# Patient Record
Sex: MALE | Race: WHITE | NOT HISPANIC OR LATINO | Employment: UNEMPLOYED | RURAL
[De-identification: names, ages, dates, MRNs, and addresses within clinical notes are randomized per-mention and may not be internally consistent; named-entity substitution may affect disease eponyms.]

---

## 2017-04-17 ENCOUNTER — OFFICE VISIT (OUTPATIENT)
Dept: FAMILY MEDICINE CLINIC | Facility: CLINIC | Age: 42
End: 2017-04-17

## 2017-04-17 VITALS
SYSTOLIC BLOOD PRESSURE: 121 MMHG | BODY MASS INDEX: 37.59 KG/M2 | WEIGHT: 248 LBS | DIASTOLIC BLOOD PRESSURE: 79 MMHG | HEART RATE: 80 BPM | TEMPERATURE: 98.1 F | OXYGEN SATURATION: 97 % | HEIGHT: 68 IN

## 2017-04-17 DIAGNOSIS — G44.89 OTHER HEADACHE SYNDROME: Primary | ICD-10-CM

## 2017-04-17 DIAGNOSIS — M54.2 NECK PAIN: ICD-10-CM

## 2017-04-17 LAB
ALBUMIN SERPL-MCNC: 4.3 G/DL (ref 3.5–5)
ALBUMIN/GLOB SERPL: 1.3 G/DL (ref 1.5–2.5)
ALP SERPL-CCNC: 85 U/L (ref 40–129)
ALT SERPL W P-5'-P-CCNC: 102 U/L (ref 10–44)
ANION GAP SERPL CALCULATED.3IONS-SCNC: 5.6 MMOL/L (ref 3.6–11.2)
AST SERPL-CCNC: 59 U/L (ref 10–34)
BASOPHILS # BLD AUTO: 0.02 10*3/MM3 (ref 0–0.3)
BASOPHILS NFR BLD AUTO: 0.3 % (ref 0–2)
BILIRUB SERPL-MCNC: 0.4 MG/DL (ref 0.2–1.8)
BUN BLD-MCNC: 12 MG/DL (ref 7–21)
BUN/CREAT SERPL: 14 (ref 7–25)
CALCIUM SPEC-SCNC: 9.2 MG/DL (ref 7.7–10)
CHLORIDE SERPL-SCNC: 110 MMOL/L (ref 99–112)
CO2 SERPL-SCNC: 24.4 MMOL/L (ref 24.3–31.9)
CREAT BLD-MCNC: 0.86 MG/DL (ref 0.43–1.29)
DEPRECATED RDW RBC AUTO: 46.1 FL (ref 37–54)
EOSINOPHIL # BLD AUTO: 0.29 10*3/MM3 (ref 0–0.7)
EOSINOPHIL NFR BLD AUTO: 4.1 % (ref 0–5)
ERYTHROCYTE [DISTWIDTH] IN BLOOD BY AUTOMATED COUNT: 14 % (ref 11.5–14.5)
ERYTHROCYTE [SEDIMENTATION RATE] IN BLOOD: 5 MM/HR (ref 0–15)
GFR SERPL CREATININE-BSD FRML MDRD: 98 ML/MIN/1.73
GLOBULIN UR ELPH-MCNC: 3.2 GM/DL
GLUCOSE BLD-MCNC: 83 MG/DL (ref 70–110)
HCT VFR BLD AUTO: 47.3 % (ref 42–52)
HGB BLD-MCNC: 16 G/DL (ref 14–18)
IMM GRANULOCYTES # BLD: 0.01 10*3/MM3 (ref 0–0.03)
IMM GRANULOCYTES NFR BLD: 0.1 % (ref 0–0.5)
LYMPHOCYTES # BLD AUTO: 3.38 10*3/MM3 (ref 1–3)
LYMPHOCYTES NFR BLD AUTO: 47.3 % (ref 21–51)
MCH RBC QN AUTO: 30.9 PG (ref 27–33)
MCHC RBC AUTO-ENTMCNC: 33.8 G/DL (ref 33–37)
MCV RBC AUTO: 91.3 FL (ref 80–94)
MONOCYTES # BLD AUTO: 0.87 10*3/MM3 (ref 0.1–0.9)
MONOCYTES NFR BLD AUTO: 12.2 % (ref 0–10)
NEUTROPHILS # BLD AUTO: 2.57 10*3/MM3 (ref 1.4–6.5)
NEUTROPHILS NFR BLD AUTO: 36 % (ref 30–70)
OSMOLALITY SERPL CALC.SUM OF ELEC: 278.3 MOSM/KG (ref 273–305)
PLATELET # BLD AUTO: 153 10*3/MM3 (ref 130–400)
PMV BLD AUTO: 12.2 FL (ref 6–10)
POTASSIUM BLD-SCNC: 4.1 MMOL/L (ref 3.5–5.3)
PROT SERPL-MCNC: 7.5 G/DL (ref 6–8)
RBC # BLD AUTO: 5.18 10*6/MM3 (ref 4.7–6.1)
SODIUM BLD-SCNC: 140 MMOL/L (ref 135–153)
TSH SERPL DL<=0.05 MIU/L-ACNC: 0.76 MIU/ML (ref 0.55–4.78)
WBC NRBC COR # BLD: 7.14 10*3/MM3 (ref 4.5–12.5)

## 2017-04-17 PROCEDURE — 85025 COMPLETE CBC W/AUTO DIFF WBC: CPT | Performed by: NURSE PRACTITIONER

## 2017-04-17 PROCEDURE — 80053 COMPREHEN METABOLIC PANEL: CPT | Performed by: NURSE PRACTITIONER

## 2017-04-17 PROCEDURE — 84443 ASSAY THYROID STIM HORMONE: CPT | Performed by: NURSE PRACTITIONER

## 2017-04-17 PROCEDURE — 99202 OFFICE O/P NEW SF 15 MIN: CPT | Performed by: NURSE PRACTITIONER

## 2017-04-17 PROCEDURE — 85652 RBC SED RATE AUTOMATED: CPT | Performed by: NURSE PRACTITIONER

## 2017-04-17 RX ORDER — AMITRIPTYLINE HYDROCHLORIDE 25 MG/1
25 TABLET, FILM COATED ORAL NIGHTLY
Qty: 30 TABLET | Refills: 0 | Status: SHIPPED | OUTPATIENT
Start: 2017-04-17 | End: 2017-05-16 | Stop reason: SDUPTHER

## 2017-04-17 NOTE — PROGRESS NOTES
"Savannah Skinner is a 41 y.o. male.   Chief Complaint   Patient presents with   • Establish Care   • Headache     The patient presents today to establish care. Has never had a PCP.    Headache    This is a new problem. The current episode started more than 1 month ago. The problem occurs intermittently. The problem has been waxing and waning. The pain is located in the occipital region. The pain radiates to the left neck and right neck. The pain quality is similar to prior headaches. The quality of the pain is described as dull. The pain is at a severity of 3/10. The pain is mild. Associated symptoms include neck pain. Pertinent negatives include no abdominal pain, back pain, blurred vision, coughing, dizziness, ear pain, fever, muscle aches, nausea, numbness, phonophobia, photophobia, rhinorrhea, scalp tenderness, seizures, sinus pressure, sore throat, tingling, vomiting or weakness. The symptoms are aggravated by work. He has tried acetaminophen for the symptoms. The treatment provided no relief. There is no history of hypertension, migraine headaches or recent head traumas.        The following portions of the patient's history were reviewed and updated as appropriate: allergies, current medications, past family history, past medical history, past social history, past surgical history and problem list.  /79 (BP Location: Left arm, Patient Position: Sitting)  Pulse 80  Temp 98.1 °F (36.7 °C) (Oral)   Ht 68\" (172.7 cm)  Wt 248 lb (112 kg)  SpO2 97% Comment: Room air  BMI 37.71 kg/m2  Review of Systems   Constitutional: Negative for chills, fatigue and fever.   HENT: Negative for congestion, ear pain, rhinorrhea, sinus pressure and sore throat.    Eyes: Negative for blurred vision and photophobia.   Respiratory: Negative for cough, shortness of breath and wheezing.    Cardiovascular: Negative for chest pain, palpitations and leg swelling.   Gastrointestinal: Negative for abdominal pain, " diarrhea, nausea and vomiting.   Genitourinary: Negative for dysuria.   Musculoskeletal: Positive for neck pain. Negative for back pain and myalgias.   Skin: Negative for rash and wound.   Neurological: Positive for headaches. Negative for dizziness, tingling, seizures, weakness, light-headedness and numbness.   Psychiatric/Behavioral: Negative for sleep disturbance. The patient is not nervous/anxious.        Objective   Physical Exam   Constitutional: He is oriented to person, place, and time. He appears well-developed and well-nourished.   HENT:   Head: Normocephalic and atraumatic.   Right Ear: External ear normal.   Left Ear: External ear normal.   Mouth/Throat: Oropharynx is clear and moist.   Eyes: EOM are normal. Pupils are equal, round, and reactive to light.   Cardiovascular: Normal rate, regular rhythm and normal heart sounds.    Pulmonary/Chest: Effort normal and breath sounds normal.   Abdominal: Soft. Bowel sounds are normal.   Musculoskeletal: Normal range of motion.   Occipital tenderness to palpation   Neurological: He is alert and oriented to person, place, and time. No cranial nerve deficit.   Finger to nose test normal   Skin: Skin is warm and dry.   Multiple tattoos noted   Psychiatric: He has a normal mood and affect. His behavior is normal.       Assessment/Plan   Joe was seen today for establish care and headache.    Diagnoses and all orders for this visit:    Other headache syndrome  -     CBC & Differential  -     Comprehensive Metabolic Panel  -     TSH  -     Sedimentation Rate  -     XR Spine Cervical 2 or 3 View; Future  -     amitriptyline (ELAVIL) 25 MG tablet; Take 1 tablet by mouth Every Night.  -     CBC Auto Differential    Neck pain  -     CBC & Differential  -     Comprehensive Metabolic Panel  -     TSH  -     Sedimentation Rate  -     XR Spine Cervical 2 or 3 View; Future  -     amitriptyline (ELAVIL) 25 MG tablet; Take 1 tablet by mouth Every Night.  -     CBC Auto  Differential        Will order lab today to rule out underlying cause of headache. I do think this is likely related to neck pain. Will order x-ray to rule out degenerative changes. Start Amitriptyline at bedtime. Administration and SE discussed. Follow up in 4 weeks and PRN.

## 2017-04-20 ENCOUNTER — HOSPITAL ENCOUNTER (OUTPATIENT)
Dept: GENERAL RADIOLOGY | Facility: HOSPITAL | Age: 42
Discharge: HOME | End: 2017-04-20
Admitting: NURSE PRACTITIONER

## 2017-04-20 DIAGNOSIS — G44.89 OTHER HEADACHE SYNDROME: ICD-10-CM

## 2017-04-20 DIAGNOSIS — M54.2 NECK PAIN: ICD-10-CM

## 2017-04-20 PROCEDURE — 72040 X-RAY EXAM NECK SPINE 2-3 VW: CPT

## 2017-04-20 PROCEDURE — 72040 X-RAY EXAM NECK SPINE 2-3 VW: CPT | Performed by: RADIOLOGY

## 2017-05-16 ENCOUNTER — OFFICE VISIT (OUTPATIENT)
Dept: FAMILY MEDICINE CLINIC | Facility: CLINIC | Age: 42
End: 2017-05-16

## 2017-05-16 VITALS
TEMPERATURE: 97.2 F | OXYGEN SATURATION: 99 % | SYSTOLIC BLOOD PRESSURE: 122 MMHG | WEIGHT: 252 LBS | HEIGHT: 68 IN | HEART RATE: 83 BPM | DIASTOLIC BLOOD PRESSURE: 79 MMHG | BODY MASS INDEX: 38.19 KG/M2

## 2017-05-16 DIAGNOSIS — G44.89 OTHER HEADACHE SYNDROME: ICD-10-CM

## 2017-05-16 DIAGNOSIS — F19.11 HISTORY OF DRUG ABUSE IN REMISSION (HCC): ICD-10-CM

## 2017-05-16 DIAGNOSIS — R79.89 ELEVATED LIVER FUNCTION TESTS: Primary | ICD-10-CM

## 2017-05-16 LAB
HAV IGM SERPL QL IA: ABNORMAL
HBV CORE IGM SERPL QL IA: ABNORMAL
HBV SURFACE AG SERPL QL CFM: NORMAL
HBV SURFACE AG SERPL QL IA: REACTIVE
HCV AB SER DONR QL: REACTIVE
HIV1+2 AB SER QL: NORMAL

## 2017-05-16 PROCEDURE — 87341 HEP B SURFACE AG NEUTRLZJ IA: CPT | Performed by: NURSE PRACTITIONER

## 2017-05-16 PROCEDURE — 80074 ACUTE HEPATITIS PANEL: CPT | Performed by: NURSE PRACTITIONER

## 2017-05-16 PROCEDURE — G0432 EIA HIV-1/HIV-2 SCREEN: HCPCS | Performed by: NURSE PRACTITIONER

## 2017-05-16 PROCEDURE — 99213 OFFICE O/P EST LOW 20 MIN: CPT | Performed by: NURSE PRACTITIONER

## 2017-05-16 RX ORDER — AMITRIPTYLINE HYDROCHLORIDE 50 MG/1
50 TABLET, FILM COATED ORAL NIGHTLY
Qty: 30 TABLET | Refills: 2 | Status: SHIPPED | OUTPATIENT
Start: 2017-05-16 | End: 2017-10-11

## 2017-05-18 ENCOUNTER — OFFICE VISIT (OUTPATIENT)
Dept: FAMILY MEDICINE CLINIC | Facility: CLINIC | Age: 42
End: 2017-05-18

## 2017-05-18 VITALS
HEART RATE: 90 BPM | WEIGHT: 252 LBS | TEMPERATURE: 98.3 F | HEIGHT: 68 IN | SYSTOLIC BLOOD PRESSURE: 134 MMHG | DIASTOLIC BLOOD PRESSURE: 86 MMHG | BODY MASS INDEX: 38.19 KG/M2 | OXYGEN SATURATION: 97 %

## 2017-05-18 DIAGNOSIS — B18.1 CHRONIC HEPATITIS B (HCC): ICD-10-CM

## 2017-05-18 DIAGNOSIS — R76.8 HEPATITIS C ANTIBODY POSITIVE IN BLOOD: Primary | ICD-10-CM

## 2017-05-18 PROCEDURE — 99213 OFFICE O/P EST LOW 20 MIN: CPT | Performed by: NURSE PRACTITIONER

## 2017-06-21 ENCOUNTER — OFFICE VISIT (OUTPATIENT)
Dept: FAMILY MEDICINE CLINIC | Facility: CLINIC | Age: 42
End: 2017-06-21

## 2017-06-21 VITALS
WEIGHT: 248.6 LBS | TEMPERATURE: 97.9 F | DIASTOLIC BLOOD PRESSURE: 82 MMHG | BODY MASS INDEX: 37.68 KG/M2 | HEART RATE: 94 BPM | HEIGHT: 68 IN | SYSTOLIC BLOOD PRESSURE: 136 MMHG | OXYGEN SATURATION: 97 %

## 2017-06-21 DIAGNOSIS — R76.8 HEPATITIS C ANTIBODY POSITIVE IN BLOOD: ICD-10-CM

## 2017-06-21 DIAGNOSIS — B18.1 CHRONIC HEPATITIS B (HCC): ICD-10-CM

## 2017-06-21 DIAGNOSIS — G44.89 HEADACHE SYNDROME: ICD-10-CM

## 2017-06-21 DIAGNOSIS — S46.911A RIGHT SHOULDER STRAIN, INITIAL ENCOUNTER: Primary | ICD-10-CM

## 2017-06-21 PROCEDURE — 99214 OFFICE O/P EST MOD 30 MIN: CPT | Performed by: NURSE PRACTITIONER

## 2017-06-21 RX ORDER — METHOCARBAMOL 500 MG/1
500 TABLET, FILM COATED ORAL 3 TIMES DAILY PRN
Qty: 42 TABLET | Refills: 0 | Status: SHIPPED | OUTPATIENT
Start: 2017-06-21 | End: 2017-10-11
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
## 2017-06-28 ENCOUNTER — TRANSCRIBE ORDERS (OUTPATIENT)
Dept: ADMINISTRATIVE | Facility: HOSPITAL | Age: 42
End: 2017-06-28

## 2017-06-28 DIAGNOSIS — B19.10 HEP B W/O COMA: ICD-10-CM

## 2017-06-28 DIAGNOSIS — B18.2 HEP C W/O COMA, CHRONIC (HCC): Primary | ICD-10-CM

## 2017-07-06 ENCOUNTER — OFFICE VISIT (OUTPATIENT)
Dept: FAMILY MEDICINE CLINIC | Facility: CLINIC | Age: 42
End: 2017-07-06

## 2017-07-06 VITALS
HEART RATE: 89 BPM | BODY MASS INDEX: 38.34 KG/M2 | TEMPERATURE: 97.3 F | SYSTOLIC BLOOD PRESSURE: 129 MMHG | HEIGHT: 68 IN | WEIGHT: 253 LBS | DIASTOLIC BLOOD PRESSURE: 88 MMHG

## 2017-07-06 DIAGNOSIS — F41.9 ANXIETY: Primary | ICD-10-CM

## 2017-07-06 PROCEDURE — 99213 OFFICE O/P EST LOW 20 MIN: CPT | Performed by: NURSE PRACTITIONER

## 2017-07-07 NOTE — PROGRESS NOTES
"Savannah Skinner is a 41 y.o. male.   Chief Complaint   Patient presents with   • Advice Only     History of Present Illness     The patient presents today to discuss anxiety.  Reports he has had situational anxiety for several years. He notices the anxiety is worse around large crowds. In the past, this has affected his ability to maintain a job. He prefers not to be in public places because of this anxiety. 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 This has been beneficial for him and helps alleviate the anxiety. He would like referral for formal evaluation.    The following portions of the patient's history were reviewed and updated as appropriate: allergies, current medications, past family history, past medical history, past social history, past surgical history and problem list.  /88 (BP Location: Left arm, Patient Position: Sitting)  Pulse 89  Temp 97.3 °F (36.3 °C) (Oral)   Ht 68\" (172.7 cm)  Wt 253 lb (115 kg)  BMI 38.47 kg/m2  Review of Systems   Constitutional: Negative for chills, fatigue and fever.   HENT: Negative for congestion, ear pain, rhinorrhea and sore throat.    Respiratory: Negative for cough, shortness of breath and wheezing.    Cardiovascular: Negative for chest pain, palpitations and leg swelling.   Gastrointestinal: Negative for abdominal pain, diarrhea, nausea and vomiting.   Genitourinary: Negative for dysuria.   Musculoskeletal: Negative for back pain and myalgias.   Skin: Negative for rash and wound.   Neurological: Positive for headaches. Negative for dizziness and light-headedness.   Psychiatric/Behavioral: Negative for sleep disturbance and suicidal ideas. The patient is nervous/anxious.        Objective   Physical Exam   Constitutional: He is oriented to person, place, and time. He appears well-developed and well-nourished.   HENT:   Head: Normocephalic and atraumatic.   Cardiovascular: Normal rate, regular rhythm and normal heart sounds.  "   Pulmonary/Chest: Effort normal and breath sounds normal.   Abdominal: Soft. Bowel sounds are normal.   Neurological: He is alert and oriented to person, place, and time.   Skin: Skin is warm and dry.   Psychiatric: He has a normal mood and affect. His behavior is normal.       Assessment/Plan   Joe was seen today for advice only.    Diagnoses and all orders for this visit:    Anxiety  -     Ambulatory Referral to Behavioral Health      We have discussed options for further evaluation and treatment. Will refer to behavioral health for further discussion. He is agreeable. Medication deferred at this time. Follow up as scheduled and needed.

## 2017-07-20 ENCOUNTER — TRANSCRIBE ORDERS (OUTPATIENT)
Dept: ADMINISTRATIVE | Facility: HOSPITAL | Age: 42
End: 2017-07-20

## 2017-07-20 ENCOUNTER — LAB (OUTPATIENT)
Dept: LAB | Facility: HOSPITAL | Age: 42
End: 2017-07-20
Attending: INTERNAL MEDICINE

## 2017-07-20 DIAGNOSIS — B18.2 CHRONIC HEPATITIS C WITH HEPATIC COMA (HCC): ICD-10-CM

## 2017-07-20 DIAGNOSIS — B18.2 CHRONIC HEPATITIS C WITH HEPATIC COMA (HCC): Primary | ICD-10-CM

## 2017-07-20 LAB
6-ACETYL MORPHINE: NEGATIVE
ALBUMIN SERPL-MCNC: 4.1 G/DL (ref 3.5–5)
ALBUMIN/GLOB SERPL: 1.3 G/DL (ref 1.5–2.5)
ALP SERPL-CCNC: 97 U/L (ref 40–129)
ALT SERPL W P-5'-P-CCNC: 235 U/L (ref 10–44)
AMPHET+METHAMPHET UR QL: NEGATIVE
ANION GAP SERPL CALCULATED.3IONS-SCNC: 2 MMOL/L (ref 3.6–11.2)
AST SERPL-CCNC: 116 U/L (ref 10–34)
BARBITURATES UR QL SCN: NEGATIVE
BASOPHILS # BLD AUTO: 0.03 10*3/MM3 (ref 0–0.3)
BASOPHILS NFR BLD AUTO: 0.4 % (ref 0–2)
BENZODIAZ UR QL SCN: NEGATIVE
BILIRUB SERPL-MCNC: 0.5 MG/DL (ref 0.2–1.8)
BUN BLD-MCNC: 12 MG/DL (ref 7–21)
BUN/CREAT SERPL: 12.9 (ref 7–25)
BUPRENORPHINE SERPL-MCNC: NEGATIVE NG/ML
CALCIUM SPEC-SCNC: 9.1 MG/DL (ref 7.7–10)
CANNABINOIDS SERPL QL: NEGATIVE
CHLORIDE SERPL-SCNC: 108 MMOL/L (ref 99–112)
CO2 SERPL-SCNC: 29 MMOL/L (ref 24.3–31.9)
COCAINE UR QL: NEGATIVE
CREAT BLD-MCNC: 0.93 MG/DL (ref 0.43–1.29)
DEPRECATED RDW RBC AUTO: 44.9 FL (ref 37–54)
EOSINOPHIL # BLD AUTO: 0.19 10*3/MM3 (ref 0–0.7)
EOSINOPHIL NFR BLD AUTO: 2.7 % (ref 0–5)
ERYTHROCYTE [DISTWIDTH] IN BLOOD BY AUTOMATED COUNT: 13.5 % (ref 11.5–14.5)
FERRITIN SERPL-MCNC: 179 NG/ML (ref 21.9–321.7)
GFR SERPL CREATININE-BSD FRML MDRD: 90 ML/MIN/1.73
GLOBULIN UR ELPH-MCNC: 3.1 GM/DL
GLUCOSE BLD-MCNC: 85 MG/DL (ref 70–110)
HAV IGM SERPL QL IA: ABNORMAL
HBV CORE IGM SERPL QL IA: ABNORMAL
HBV SURFACE AB SER RIA-ACNC: NORMAL
HBV SURFACE AG SERPL QL CFM: NORMAL
HBV SURFACE AG SERPL QL IA: REACTIVE
HCT VFR BLD AUTO: 47.4 % (ref 42–52)
HCV AB SER DONR QL: REACTIVE
HGB BLD-MCNC: 16.6 G/DL (ref 14–18)
HIV1+2 AB SER QL: NORMAL
IMM GRANULOCYTES # BLD: 0 10*3/MM3 (ref 0–0.03)
IMM GRANULOCYTES NFR BLD: 0 % (ref 0–0.5)
INR PPP: 1.01 (ref 0.9–1.1)
IRON 24H UR-MRATE: 168 MCG/DL (ref 53–167)
IRON SATN MFR SERPL: 55 % (ref 20–50)
LYMPHOCYTES # BLD AUTO: 2.28 10*3/MM3 (ref 1–3)
LYMPHOCYTES NFR BLD AUTO: 32.7 % (ref 21–51)
MCH RBC QN AUTO: 32 PG (ref 27–33)
MCHC RBC AUTO-ENTMCNC: 35 G/DL (ref 33–37)
MCV RBC AUTO: 91.5 FL (ref 80–94)
METHADONE UR QL SCN: NEGATIVE
MONOCYTES # BLD AUTO: 0.86 10*3/MM3 (ref 0.1–0.9)
MONOCYTES NFR BLD AUTO: 12.3 % (ref 0–10)
NEUTROPHILS # BLD AUTO: 3.62 10*3/MM3 (ref 1.4–6.5)
NEUTROPHILS NFR BLD AUTO: 51.9 % (ref 30–70)
OPIATES UR QL: NEGATIVE
OSMOLALITY SERPL CALC.SUM OF ELEC: 276.5 MOSM/KG (ref 273–305)
OXYCODONE UR QL SCN: NEGATIVE
PCP UR QL SCN: NEGATIVE
PLATELET # BLD AUTO: 128 10*3/MM3 (ref 130–400)
PMV BLD AUTO: 11.9 FL (ref 6–10)
POTASSIUM BLD-SCNC: 4.3 MMOL/L (ref 3.5–5.3)
PROT SERPL-MCNC: 7.2 G/DL (ref 6–8)
PROTHROMBIN TIME: 13.4 SECONDS (ref 11–15.4)
RBC # BLD AUTO: 5.18 10*6/MM3 (ref 4.7–6.1)
SODIUM BLD-SCNC: 139 MMOL/L (ref 135–153)
TIBC SERPL-MCNC: 306 MCG/DL (ref 241–421)
WBC NRBC COR # BLD: 6.98 10*3/MM3 (ref 4.5–12.5)

## 2017-07-20 PROCEDURE — 86225 DNA ANTIBODY NATIVE: CPT

## 2017-07-20 PROCEDURE — 87522 HEPATITIS C REVRS TRNSCRPJ: CPT | Performed by: INTERNAL MEDICINE

## 2017-07-20 PROCEDURE — 84460 ALANINE AMINO (ALT) (SGPT): CPT | Performed by: INTERNAL MEDICINE

## 2017-07-20 PROCEDURE — 86705 HEP B CORE ANTIBODY IGM: CPT | Performed by: INTERNAL MEDICINE

## 2017-07-20 PROCEDURE — 80307 DRUG TEST PRSMV CHEM ANLYZR: CPT | Performed by: INTERNAL MEDICINE

## 2017-07-20 PROCEDURE — 82977 ASSAY OF GGT: CPT | Performed by: INTERNAL MEDICINE

## 2017-07-20 PROCEDURE — 85610 PROTHROMBIN TIME: CPT | Performed by: INTERNAL MEDICINE

## 2017-07-20 PROCEDURE — 85025 COMPLETE CBC W/AUTO DIFF WBC: CPT | Performed by: INTERNAL MEDICINE

## 2017-07-20 PROCEDURE — 82728 ASSAY OF FERRITIN: CPT | Performed by: INTERNAL MEDICINE

## 2017-07-20 PROCEDURE — 83516 IMMUNOASSAY NONANTIBODY: CPT | Performed by: INTERNAL MEDICINE

## 2017-07-20 PROCEDURE — 86706 HEP B SURFACE ANTIBODY: CPT | Performed by: INTERNAL MEDICINE

## 2017-07-20 PROCEDURE — 87350 HEPATITIS BE AG IA: CPT | Performed by: INTERNAL MEDICINE

## 2017-07-20 PROCEDURE — 83550 IRON BINDING TEST: CPT | Performed by: INTERNAL MEDICINE

## 2017-07-20 PROCEDURE — 83883 ASSAY NEPHELOMETRY NOT SPEC: CPT | Performed by: INTERNAL MEDICINE

## 2017-07-20 PROCEDURE — 80074 ACUTE HEPATITIS PANEL: CPT | Performed by: INTERNAL MEDICINE

## 2017-07-20 PROCEDURE — 80053 COMPREHEN METABOLIC PANEL: CPT | Performed by: INTERNAL MEDICINE

## 2017-07-20 PROCEDURE — G0432 EIA HIV-1/HIV-2 SCREEN: HCPCS | Performed by: INTERNAL MEDICINE

## 2017-07-20 PROCEDURE — 86704 HEP B CORE ANTIBODY TOTAL: CPT | Performed by: INTERNAL MEDICINE

## 2017-07-20 PROCEDURE — 87341 HEP B SURFACE AG NEUTRLZJ IA: CPT | Performed by: INTERNAL MEDICINE

## 2017-07-20 PROCEDURE — 83010 ASSAY OF HAPTOGLOBIN QUANT: CPT | Performed by: INTERNAL MEDICINE

## 2017-07-20 PROCEDURE — 83540 ASSAY OF IRON: CPT | Performed by: INTERNAL MEDICINE

## 2017-07-20 PROCEDURE — 82247 BILIRUBIN TOTAL: CPT | Performed by: INTERNAL MEDICINE

## 2017-07-20 PROCEDURE — 87517 HEPATITIS B DNA QUANT: CPT | Performed by: INTERNAL MEDICINE

## 2017-07-20 PROCEDURE — 86038 ANTINUCLEAR ANTIBODIES: CPT | Performed by: INTERNAL MEDICINE

## 2017-07-20 PROCEDURE — 87902 NFCT AGT GNTYP ALYS HEP C: CPT | Performed by: INTERNAL MEDICINE

## 2017-07-20 PROCEDURE — 36415 COLL VENOUS BLD VENIPUNCTURE: CPT

## 2017-07-20 PROCEDURE — 82105 ALPHA-FETOPROTEIN SERUM: CPT | Performed by: INTERNAL MEDICINE

## 2017-07-20 PROCEDURE — 87521 HEPATITIS C PROBE&RVRS TRNSC: CPT | Performed by: INTERNAL MEDICINE

## 2017-07-21 LAB
ACTIN IGG SERPL-ACNC: 7 UNITS (ref 0–19)
AFP-TM SERPL-MCNC: 3.6 NG/ML (ref 0–8.3)
ANA SER QL: POSITIVE
DSDNA AB SER-ACNC: 26 IU/ML (ref 0–9)
HBV CORE AB SER DONR QL IA: POSITIVE
HBV CORE IGM SERPL QL IA: NEGATIVE
HBV E AG SERPL QL IA: POSITIVE
Lab: ABNORMAL

## 2017-07-22 LAB
HBV DNA SERPL NAA+PROBE-ACNC: NORMAL IU/ML
HBV DNA SERPL NAA+PROBE-ACNC: NORMAL IU/ML
HEPATITIS C RNA-NAA: NEGATIVE
LOG10 HBV IU/ML: 8.96 LOG10IU/ML
TEST INFORMATION: NORMAL

## 2017-07-24 LAB
A2 MACROGLOB SERPL-MCNC: 174 MG/DL (ref 110–276)
ALT SERPL W P-5'-P-CCNC: 231 IU/L (ref 0–55)
APO A-I SERPL-MCNC: 115 MG/DL (ref 101–178)
BILIRUB SERPL-MCNC: 0.4 MG/DL (ref 0–1.2)
FIBROSIS SCORING:: ABNORMAL
FIBROSIS STAGE SERPL QL: ABNORMAL
GGT SERPL-CCNC: 102 IU/L (ref 0–65)
HAPTOGLOB SERPL-MCNC: 80 MG/DL (ref 34–200)
HCV AB SER QL: ABNORMAL
HCV GENTYP SERPL NAA+PROBE: NORMAL
HCV RNA SERPL NAA+PROBE-ACNC: NORMAL IU/ML
LABORATORY COMMENT REPORT: ABNORMAL
LIMITATIONS:: ABNORMAL
LIVER FIBR SCORE SERPL CALC.FIBROSURE: 0.33 (ref 0–0.21)
Lab: NORMAL
NECROINFLAMM ACTIVITY SCORING:: ABNORMAL
NECROINFLAMMATORY ACT GRADE SERPL QL: ABNORMAL
NECROINFLAMMATORY ACT SCORE SERPL: 0.85 (ref 0–0.17)
TEST INFORMATION: NORMAL

## 2017-07-28 ENCOUNTER — OFFICE VISIT (OUTPATIENT)
Dept: PSYCHIATRY | Facility: CLINIC | Age: 42
End: 2017-07-28

## 2017-07-28 DIAGNOSIS — F60.0 PARANOID PERSONALITY DISORDER (HCC): ICD-10-CM

## 2017-07-28 DIAGNOSIS — F13.21 BENZODIAZEPINE DEPENDENCE IN REMISSION (HCC): ICD-10-CM

## 2017-07-28 DIAGNOSIS — F11.21 OPIOID DEPENDENCE IN REMISSION (HCC): ICD-10-CM

## 2017-07-28 DIAGNOSIS — F10.21 ALCOHOL DEPENDENCE IN REMISSION (HCC): ICD-10-CM

## 2017-07-28 DIAGNOSIS — F14.21 COCAINE DEPENDENCE IN REMISSION (HCC): ICD-10-CM

## 2017-07-28 DIAGNOSIS — F15.21 METHAMPHETAMINE DEPENDENCE IN REMISSION (HCC): ICD-10-CM

## 2017-07-28 DIAGNOSIS — F43.10 POST TRAUMATIC STRESS DISORDER (PTSD): Primary | ICD-10-CM
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
## 2017-09-08 ENCOUNTER — OFFICE VISIT (OUTPATIENT)
Dept: PSYCHIATRY | Facility: CLINIC | Age: 42
End: 2017-09-08

## 2017-09-08 DIAGNOSIS — F43.10 POST TRAUMATIC STRESS DISORDER (PTSD): Primary | ICD-10-CM

## 2017-09-08 DIAGNOSIS — F10.21 ALCOHOL DEPENDENCE IN REMISSION (HCC): ICD-10-CM

## 2017-09-08 DIAGNOSIS — F14.21 COCAINE DEPENDENCE IN REMISSION (HCC): ICD-10-CM

## 2017-09-08 DIAGNOSIS — F13.21 BENZODIAZEPINE DEPENDENCE IN REMISSION (HCC): ICD-10-CM

## 2017-09-08 DIAGNOSIS — F11.21 OPIOID DEPENDENCE IN REMISSION (HCC): ICD-10-CM

## 2017-09-08 DIAGNOSIS — F15.21 METHAMPHETAMINE DEPENDENCE IN REMISSION (HCC): ICD-10-CM

## 2017-09-08 PROCEDURE — 90834 PSYTX W PT 45 MINUTES: CPT | Performed by: SOCIAL WORKER

## 2017-09-08 NOTE — PROGRESS NOTES
Date of Service: September 8, 2017  Time In: 12:40 PM  Time Out: 1:20 PM      PROGRESS NOTE  Data:  Joe Skinner is a 41 y.o. male who met 1:1 with Jim Araiza LCSW for regularly scheduled individual outpatient psychotherapy session at [facility] for follow-up of posttraumatic stress disorder.  Patient reports he continues to be employed at [employer] and request a diagnosis of her from the undersigned.     HPI: Patient reports he continues to work daily and states he has little time for anything else.  He reports he continues to struggle with hypervigilance, increased on response, a general paranoid outlook, feeling on edge, tendency to lash out at others, a tendency to interpret benign situations as potentially threatening, an attempt to avoid cues, avoidance of social situations and interactions with others, and periodic traumatic dreams.  Patient rates current symptoms of posttraumatic stress disorder at an 8 on a scale of 1-10 with 10 being most severe.  Patient reports he is not currently taking any medications and states he prefers not to.  He reports he continues to maintain sobriety and reports no sobriety threatening issues.  Patient also reports no perceptual disturbance.  The patient adamantly convincingly denies suicidal ideation and vehemently denies any substance use.      Clinical Maneuvering/Intervention:  Assisted patient in processing above session content; acknowledged and normalized patient’s thoughts, feelings, and concerns.  Allow the patient to discuss/vent his feelings and frustrations concerning his ongoing symptoms and the negative impact on his life and validated his feelings.  Also assisted the patient in safely discussing the trauma he experienced while incarcerated in an attempt to desensitize.  Also lies cognitive behavioral therapy to assist the patient in developing appropriate coping mechanisms to decrease  severity and frequency of symptoms including thought blocking techniques, positive self talk, and challenging faulty, irrational thoughts with factual ARGUMENTS.  THE UNDERSIGNED PROVIDED THE PATIENT WITH REQUESTED LETTER.  PROVIDED UNCONDITIONAL POSITIVE REGARD IN A SAFE, SUPPORTIVE ENVIRONMENT.    Allowed patient to freely discuss issues without interruption or judgment. Provided safe, confidential environment to facilitate the development of positive therapeutic relationship and encourage open, honest communication. Assisted patient in identifying risk factors which would indicate the need for higher level of care including thoughts to harm self or others and/or self-harming behavior and encouraged patient to contact this office, call 911, or present to the nearest emergency room should any of these events occur. Discussed crisis intervention services and means to access.  Patient adamantly and convincingly denies current suicidal or homicidal ideation or perceptual disturbance.    Assessment    The patient appears to maintain relative stability, however, he continues to struggle with significant symptoms of posttraumatic stress disorder which continues to cause significant impairment in important areas of functioning including social and interpersonal domains.  As a result, he can be recently expected to continues to benefit from treatment and would likely be at increased risk for decompensation otherwise.    Diagnoses and all orders for this visit:    Post traumatic stress disorder (PTSD)    Opioid dependence in remission    Benzodiazepine dependence in remission    Methamphetamine dependence in remission    Cocaine dependence in remission    Alcohol dependence in remission               Mental Status Exam  Hygiene:  good  Dress:  casual  Attitude:  Guarded  Motor Activity:  Appropriate  Speech:  Monotone  Mood:  Stoic  Affect:  Somewhat flat  Thought Processes:  Goal directed and Linear  Thought Content:   normal  Suicidal Thoughts:  denies  Homicidal Thoughts:  denies  Crisis Safety Plan: yes, to come to the emergency room.  Hallucinations:  denies    Patient's Support Network Includes:  significant other    Progress toward goal: Not at goal    Functional Status: Moderate impairment     Prognosis: Fair with Ongoing Treatment     Plan         Patient will continue in individual outpatient psychotherapy session at [facility] every 2-3 weeks. Patient will contact this office, call 911 or present to the nearest emergency room should suicidal or homicidal ideations occur. Provide Cognitive Behavioral Therapy and Solution Focused Therapy to improve functioning, maintain stability, and avoid decompensation and the need for higher level of care.          Return in about 3 weeks (around 09/29/2017) for Next scheduled follow up.    Jim Araiza LCSW, JUAN C     This document signed by Jim Araiza LCSW, JUAN C September 8, 2017 3:00 PM

## 2017-09-26 ENCOUNTER — OFFICE VISIT (OUTPATIENT)
Dept: FAMILY MEDICINE CLINIC | Facility: CLINIC | Age: 42
End: 2017-09-26

## 2017-09-26 VITALS
TEMPERATURE: 97.6 F | HEIGHT: 68 IN | HEART RATE: 87 BPM | DIASTOLIC BLOOD PRESSURE: 85 MMHG | BODY MASS INDEX: 38.16 KG/M2 | SYSTOLIC BLOOD PRESSURE: 135 MMHG | WEIGHT: 251.8 LBS

## 2017-09-26 DIAGNOSIS — B35.2 TINEA MANUS: ICD-10-CM

## 2017-09-26 DIAGNOSIS — M54.2 NECK PAIN: ICD-10-CM

## 2017-09-26 DIAGNOSIS — R30.0 DYSURIA: Primary | ICD-10-CM

## 2017-09-26 DIAGNOSIS — G44.89 HEADACHE SYNDROME: ICD-10-CM

## 2017-09-26 LAB
BILIRUB BLD-MCNC: NEGATIVE MG/DL
CLARITY, POC: ABNORMAL
COLOR UR: ABNORMAL
GLUCOSE UR STRIP-MCNC: NEGATIVE MG/DL
KETONES UR QL: NEGATIVE
LEUKOCYTE EST, POC: NEGATIVE
NITRITE UR-MCNC: NEGATIVE MG/ML
PH UR: 7 [PH] (ref 5–8)
PROT UR STRIP-MCNC: NEGATIVE MG/DL
RBC # UR STRIP: NEGATIVE /UL
SP GR UR: 1.01 (ref 1–1.03)
UROBILINOGEN UR QL: NORMAL

## 2017-09-26 PROCEDURE — 99214 OFFICE O/P EST MOD 30 MIN: CPT | Performed by: NURSE PRACTITIONER

## 2017-09-26 RX ORDER — CLOTRIMAZOLE AND BETAMETHASONE DIPROPIONATE 10; .64 MG/G; MG/G
CREAM TOPICAL 2 TIMES DAILY
Qty: 45 G | Refills: 0 | Status: SHIPPED | OUTPATIENT
Start: 2017-09-26 | End: 2017-10-11

## 2017-10-11 ENCOUNTER — OFFICE VISIT (OUTPATIENT)
Dept: RETAIL CLINIC | Facility: CLINIC | Age: 42
End: 2017-10-11

## 2017-10-11 VITALS
TEMPERATURE: 97.6 F | HEART RATE: 70 BPM | OXYGEN SATURATION: 97 % | RESPIRATION RATE: 20 BRPM | BODY MASS INDEX: 37.74 KG/M2 | WEIGHT: 248.2 LBS

## 2017-10-11 DIAGNOSIS — J40 BRONCHITIS: Primary | ICD-10-CM

## 2017-10-11 PROCEDURE — 99213 OFFICE O/P EST LOW 20 MIN: CPT | Performed by: NURSE PRACTITIONER

## 2017-10-11 RX ORDER — ENTECAVIR 0.5 MG/1
0.5 TABLET, FILM COATED ORAL DAILY
COMMUNITY

## 2017-10-11 RX ORDER — PREDNISONE 5 MG/1
1 TABLET ORAL DAILY
Qty: 1 EACH | Refills: 0 | Status: SHIPPED | OUTPATIENT
Start: 2017-10-11 | End: 2017-10-12

## 2017-10-11 NOTE — PATIENT INSTRUCTIONS
Acute Bronchitis  Bronchitis is inflammation of the airways that extend from the windpipe into the lungs (bronchi). The inflammation often causes mucus to develop. This leads to a cough, which is the most common symptom of bronchitis.   In acute bronchitis, the condition usually develops suddenly and goes away over time, usually in a couple weeks. Smoking, allergies, and asthma can make bronchitis worse. Repeated episodes of bronchitis may cause further lung problems.   CAUSES  Acute bronchitis is most often caused by the same virus that causes a cold. The virus can spread from person to person (contagious) through coughing, sneezing, and touching contaminated objects.  SIGNS AND SYMPTOMS   · Cough.    · Fever.    · Coughing up mucus.    · Body aches.    · Chest congestion.    · Chills.    · Shortness of breath.    · Sore throat.    DIAGNOSIS   Acute bronchitis is usually diagnosed through a physical exam. Your health care provider will also ask you questions about your medical history. Tests, such as chest X-rays, are sometimes done to rule out other conditions.   TREATMENT   Acute bronchitis usually goes away in a couple weeks. Oftentimes, no medical treatment is necessary. Medicines are sometimes given for relief of fever or cough. Antibiotic medicines are usually not needed but may be prescribed in certain situations. In some cases, an inhaler may be recommended to help reduce shortness of breath and control the cough. A cool mist vaporizer may also be used to help thin bronchial secretions and make it easier to clear the chest.   HOME CARE INSTRUCTIONS  · Get plenty of rest.    · Drink enough fluids to keep your urine clear or pale yellow (unless you have a medical condition that requires fluid restriction). Increasing fluids may help thin your respiratory secretions (sputum) and reduce chest congestion, and it will prevent dehydration.    · Take medicines only as directed by your health care provider.  · If  you were prescribed an antibiotic medicine, finish it all even if you start to feel better.  · Avoid smoking and secondhand smoke. Exposure to cigarette smoke or irritating chemicals will make bronchitis worse. If you are a smoker, consider using nicotine gum or skin patches to help control withdrawal symptoms. Quitting smoking will help your lungs heal faster.    · Reduce the chances of another bout of acute bronchitis by washing your hands frequently, avoiding people with cold symptoms, and trying not to touch your hands to your mouth, nose, or eyes.    · Keep all follow-up visits as directed by your health care provider.    SEEK MEDICAL CARE IF:  Your symptoms do not improve after 1 week of treatment.   SEEK IMMEDIATE MEDICAL CARE IF:  · You develop an increased fever or chills.    · You have chest pain.    · You have severe shortness of breath.  · You have bloody sputum.    · You develop dehydration.  · You faint or repeatedly feel like you are going to pass out.  · You develop repeated vomiting.  · You develop a severe headache.  MAKE SURE YOU:   · Understand these instructions.  · Will watch your condition.  · Will get help right away if you are not doing well or get worse.     This information is not intended to replace advice given to you by your health care provider. Make sure you discuss any questions you have with your health care provider.     Document Released: 01/25/2006 Document Revised: 01/08/2016 Document Reviewed: 06/10/2014  [employer] Interactive Patient Education ©2017 [employer]

## 2017-10-11 NOTE — PROGRESS NOTES
Subjective   Joe Skinner is a 42 y.o. male.   Chief Complaint   Patient presents with   • URI      URI    This is a new problem. The current episode started in the past 7 days. The problem has been waxing and waning. There has been no fever. Associated symptoms include congestion and coughing (brown productive). Pertinent negatives include no rhinorrhea, sinus pain or sore throat. He has tried nothing for the symptoms.          Joe presents to [facility] with cc of cough and congestion for 1 week, he denies fever or chilling and says she has taken nothing for his symptoms.  Reviewd PMFSH.  See ROS.      The following portions of the patient's history were reviewed and updated as appropriate: allergies, current medications, past family history, past medical history, past social history, past surgical history and problem list.    Review of Systems   HENT: Positive for congestion. Negative for rhinorrhea, sinus pain and sore throat.    Respiratory: Positive for cough (brown productive). Negative for shortness of breath.      Pulse 70  Temp 97.6 °F (36.4 °C) (Temporal Artery )   Resp 20  Wt 248 lb 3.2 oz (113 kg)  SpO2 97%  BMI 37.74 kg/m2    Objective     Current Outpatient Prescriptions:   •  entecavir (BARACLUDE) 0.5 MG tablet, Take 0.5 mg by mouth Daily., Disp: , Rfl:   •  PredniSONE 5 MG (48) tablet therapy pack dosepak, Take 1 tablet by mouth Daily for 6 days. Take as directed on package instructions., Disp: 1 each, Rfl: 0  No Known Allergies    Physical Exam   Constitutional: He is oriented to person, place, and time. He appears well-developed and well-nourished. No distress.   HENT:   Head: Normocephalic and atraumatic.   Right Ear: Tympanic membrane and external ear normal.   Left Ear: Tympanic membrane and external ear normal.   Nose: Mucosal edema present. Right sinus exhibits no maxillary sinus tenderness and no frontal sinus tenderness. Left sinus exhibits no maxillary sinus tenderness and no frontal  sinus tenderness.   Mouth/Throat: Uvula is midline and mucous membranes are normal. Posterior oropharyngeal erythema present. No oropharyngeal exudate. Tonsils are 1+ on the right. Tonsils are 1+ on the left. No tonsillar exudate.   Eyes: Conjunctivae and EOM are normal. Pupils are equal, round, and reactive to light.   Neck: Normal range of motion.   Cardiovascular: Normal rate, regular rhythm and normal heart sounds.    Pulmonary/Chest: Effort normal. No respiratory distress. He has wheezes (scatterd bilateral wheezing). He has no rales. He exhibits no tenderness.   Abdominal: Soft. Bowel sounds are normal. He exhibits no distension. There is no tenderness.   Musculoskeletal: Normal range of motion.   Lymphadenopathy:     He has no cervical adenopathy.   Neurological: He is alert and oriented to person, place, and time.   Skin: Skin is warm and dry.   Psychiatric: He has a normal mood and affect. His behavior is normal. Judgment and thought content normal.   Nursing note and vitals reviewed.      Assessment/Plan   Joe was seen today for uri.    Diagnoses and all orders for this visit:    Bronchitis  -     PredniSONE 5 MG (48) tablet therapy pack dosepak; Take 1 tablet by mouth Daily for 6 days. Take as directed on package instructions.

## 2017-10-12 ENCOUNTER — OFFICE VISIT (OUTPATIENT)
Dept: FAMILY MEDICINE CLINIC | Facility: CLINIC | Age: 42
End: 2017-10-12

## 2017-10-12 ENCOUNTER — TELEPHONE (OUTPATIENT)
Dept: FAMILY MEDICINE CLINIC | Facility: CLINIC | Age: 42
End: 2017-10-12

## 2017-10-12 VITALS
TEMPERATURE: 98.4 F | DIASTOLIC BLOOD PRESSURE: 82 MMHG | WEIGHT: 252.2 LBS | HEIGHT: 68 IN | HEART RATE: 72 BPM | SYSTOLIC BLOOD PRESSURE: 129 MMHG | BODY MASS INDEX: 38.22 KG/M2

## 2017-10-12 DIAGNOSIS — Z72.0 TOBACCO ABUSE DISORDER: ICD-10-CM

## 2017-10-12 DIAGNOSIS — J20.8 ACUTE BRONCHITIS DUE TO OTHER SPECIFIED ORGANISMS: Primary | ICD-10-CM

## 2017-10-12 PROCEDURE — 99213 OFFICE O/P EST LOW 20 MIN: CPT | Performed by: NURSE PRACTITIONER

## 2017-10-12 PROCEDURE — 99406 BEHAV CHNG SMOKING 3-10 MIN: CPT | Performed by: NURSE PRACTITIONER

## 2017-10-12 RX ORDER — ALBUTEROL SULFATE 90 UG/1
1 AEROSOL, METERED RESPIRATORY (INHALATION) EVERY 6 HOURS PRN
Qty: 1 INHALER | Refills: 0 | Status: SHIPPED | OUTPATIENT
Start: 2017-10-12

## 2017-10-12 RX ORDER — NICOTINE 21 MG/24HR
1 PATCH, TRANSDERMAL 24 HOURS TRANSDERMAL EVERY 24 HOURS
Qty: 21 PATCH | Refills: 0 | Status: SHIPPED | OUTPATIENT
Start: 2017-10-12

## 2017-10-12 RX ORDER — GUAIFENESIN 600 MG/1
600 TABLET, EXTENDED RELEASE ORAL 2 TIMES DAILY
Qty: 14 TABLET | Refills: 0 | Status: SHIPPED | OUTPATIENT
Start: 2017-10-12

## 2017-10-12 RX ORDER — DOXYCYCLINE 100 MG/1
100 CAPSULE ORAL 2 TIMES DAILY
Qty: 20 CAPSULE | Refills: 0 | Status: SHIPPED | OUTPATIENT
Start: 2017-10-12

## 2017-10-12 NOTE — PROGRESS NOTES
"Savannah Skinner is a 42 y.o. male.   Chief Complaint   Patient presents with   • deep cough     Cough   This is a new problem. The current episode started in the past 7 days. The problem has been gradually worsening. The problem occurs every few minutes. The cough is productive of sputum. Associated symptoms include shortness of breath and wheezing. Pertinent negatives include no chest pain, chills, ear congestion, ear pain, fever, myalgias, nasal congestion, postnasal drip, rash, rhinorrhea or sore throat. The symptoms are aggravated by lying down. He has tried a beta-agonist inhaler for the symptoms. The treatment provided mild relief.    He also states he is ready to quit smoking.    The following portions of the patient's history were reviewed and updated as appropriate: allergies, current medications, past family history, past medical history, past social history, past surgical history and problem list.  /82 (BP Location: Left arm, Patient Position: Sitting, Cuff Size: Adult)  Pulse 72  Temp 98.4 °F (36.9 °C) (Oral)   Ht 68\" (172.7 cm)  Wt 252 lb 3.2 oz (114 kg)  BMI 38.35 kg/m2  Review of Systems   Constitutional: Positive for fatigue. Negative for chills and fever.   HENT: Negative for congestion, ear pain, postnasal drip, rhinorrhea and sore throat.    Respiratory: Positive for cough, shortness of breath and wheezing.    Cardiovascular: Negative for chest pain, palpitations and leg swelling.   Gastrointestinal: Negative for abdominal pain, diarrhea, nausea and vomiting.   Genitourinary: Negative for dysuria.   Musculoskeletal: Negative for back pain and myalgias.   Skin: Negative for rash and wound.   Neurological: Negative for dizziness and light-headedness.   Psychiatric/Behavioral: Negative for sleep disturbance. The patient is not nervous/anxious.        Objective   Physical Exam   Constitutional: He is oriented to person, place, and time. He appears well-developed and " well-nourished.   HENT:   Head: Normocephalic and atraumatic.   Right Ear: External ear normal.   Left Ear: External ear normal.   Mouth/Throat: Oropharynx is clear and moist.   Cardiovascular: Normal rate, regular rhythm and normal heart sounds.    Pulmonary/Chest: Effort normal. He has wheezes.   Wheezes, rhonchi noted throughout   Abdominal: Soft. Bowel sounds are normal.   Musculoskeletal: Normal range of motion.   Neurological: He is alert and oriented to person, place, and time.   Skin: Skin is warm and dry.   Psychiatric: He has a normal mood and affect. His behavior is normal.       Assessment/Plan   Joe was seen today for deep cough.    Diagnoses and all orders for this visit:    Acute bronchitis due to other specified organisms  -     doxycycline (MONODOX) 100 MG capsule; Take 1 capsule by mouth 2 (Two) Times a Day.  -     guaiFENesin (MUCINEX) 600 MG 12 hr tablet; Take 1 tablet by mouth 2 (Two) Times a Day.  -     albuterol (PROVENTIL HFA;VENTOLIN HFA) 108 (90 Base) MCG/ACT inhaler; Inhale 1 puff Every 6 (Six) Hours As Needed for Wheezing.    Tobacco abuse disorder      For the bronchitis, will treat with doxycycline. Administration and SE discussed.  He will also start Mucinex and albuterol inhaler for PRN use. Stay hydrated. Supportive measures encouraged.  We have spent 3 minutes in discussion regarding smoking cessation. Will initiate Nicotine patches. Administration and SE discussed.  Encouraged to set a stop date. Follow up as scheduled and needed.

## 2017-10-12 NOTE — PATIENT INSTRUCTIONS
Steps to Quit Smoking   Smoking tobacco can be harmful to your health and can affect almost every organ in your body. Smoking puts you, and those around you, at risk for developing many serious chronic diseases. Quitting smoking is difficult, but it is one of the best things that you can do for your health. It is never too late to quit.  WHAT ARE THE BENEFITS OF QUITTING SMOKING?  When you quit smoking, you lower your risk of developing serious diseases and conditions, such as:  · Lung cancer or lung disease, such as COPD.  · Heart disease.  · Stroke.  · Heart attack.  · Infertility.  · Osteoporosis and bone fractures.  Additionally, symptoms such as coughing, wheezing, and shortness of breath may get better when you quit. You may also find that you get sick less often because your body is stronger at fighting off colds and infections. If you are pregnant, quitting smoking can help to reduce your chances of having a baby of low birth weight.  HOW DO I GET READY TO QUIT?  When you decide to quit smoking, create a plan to make sure that you are successful. Before you quit:  · Pick a date to quit. Set a date within the next two weeks to give you time to prepare.  · Write down the reasons why you are quitting. Keep this list in places where you will see it often, such as on your bathroom mirror or in your car or wallet.  · Identify the people, places, things, and activities that make you want to smoke (triggers) and avoid them. Make sure to take these actions:    Throw away all cigarettes at home, at work, and in your car.    Throw away smoking accessories, such as ashtrays and lighters.    Clean your car and make sure to empty the ashtray.    Clean your home, including curtains and carpets.  · Tell your family, friends, and coworkers that you are quitting. Support from your loved ones can make quitting easier.  · Talk with your health care provider about your options for quitting smoking.  · Find out what treatment  "options are covered by your health insurance.  WHAT STRATEGIES CAN I USE TO QUIT SMOKING?   Talk with your healthcare provider about different strategies to quit smoking. Some strategies include:  · Quitting smoking altogether instead of gradually lessening how much you smoke over a period of time. Research shows that quitting \"cold turkey\" is more successful than gradually quitting.  · Attending in-person counseling to help you build problem-solving skills. You are more likely to have success in quitting if you attend several counseling sessions. Even short sessions of 10 minutes can be effective.  · Finding resources and support systems that can help you to quit smoking and remain smoke-free after you quit. These resources are most helpful when you use them often. They can include:    Online chats with a counselor.    Telephone quitlines.    Printed self-help materials.    Support groups or group counseling.    Text messaging programs.    Mobile phone applications.  · Taking medicines to help you quit smoking. (If you are pregnant or breastfeeding, talk with your health care provider first.) Some medicines contain nicotine and some do not. Both types of medicines help with cravings, but the medicines that include nicotine help to relieve withdrawal symptoms. Your health care provider may recommend:    Nicotine patches, gum, or lozenges.    Nicotine inhalers or sprays.    Non-nicotine medicine that is taken by mouth.  Talk with your health care provider about combining strategies, such as taking medicines while you are also receiving in-person counseling. Using these two strategies together makes you more likely to succeed in quitting than if you used either strategy on its own.  If you are pregnant or breastfeeding, talk with your health care provider about finding counseling or other support strategies to quit smoking. Do not take medicine to help you quit smoking unless told to do so by your health care " provider.  WHAT THINGS CAN I DO TO MAKE IT EASIER TO QUIT?  Quitting smoking might feel overwhelming at first, but there is a lot that you can do to make it easier. Take these important actions:  · Reach out to your family and friends and ask that they support and encourage you during this time. Call telephone quitlines, reach out to support groups, or work with a counselor for support.  · Ask people who smoke to avoid smoking around you.  · Avoid places that trigger you to smoke, such as bars, parties, or smoke-break areas at work.  · Spend time around people who do not smoke.  · Lessen stress in your life, because stress can be a smoking trigger for some people. To lessen stress, try:    Exercising regularly.    Deep-breathing exercises.    Yoga.    Meditating.    Performing a body scan. This involves closing your eyes, scanning your body from head to toe, and noticing which parts of your body are particularly tense. Purposefully relax the muscles in those areas.  · Download or purchase mobile phone or tablet apps (applications) that can help you stick to your quit plan by providing reminders, tips, and encouragement. There are many free apps, such as QuitGuide from the CDC (Centers for Disease Control and Prevention). You can find other support for quitting smoking (smoking cessation) through smokefree.gov and other websites.  HOW WILL I FEEL WHEN I QUIT SMOKING?  Within the first 24 hours of quitting smoking, you may start to feel some withdrawal symptoms. These symptoms are usually most noticeable 2-3 days after quitting, but they usually do not last beyond 2-3 weeks. Changes or symptoms that you might experience include:  · Mood swings.  · Restlessness, anxiety, or irritation.  · Difficulty concentrating.  · Dizziness.  · Strong cravings for sugary foods in addition to nicotine.  · Mild weight gain.  · Constipation.  · Nausea.  · Coughing or a sore throat.  · Changes in how your medicines work in your  body.  · A depressed mood.  · Difficulty sleeping (insomnia).  After the first 2-3 weeks of quitting, you may start to notice more positive results, such as:  · Improved sense of smell and taste.  · Decreased coughing and sore throat.  · Slower heart rate.  · Lower blood pressure.  · Clearer skin.  · The ability to breathe more easily.  · Fewer sick days.  Quitting smoking is very challenging for most people. Do not get discouraged if you are not successful the first time. Some people need to make many attempts to quit before they achieve long-term success. Do your best to stick to your quit plan, and talk with your health care provider if you have any questions or concerns.     This information is not intended to replace advice given to you by your health care provider. Make sure you discuss any questions you have with your health care provider.     Document Released: 12/12/2002 Document Revised: 05/03/2016 Document Reviewed: 05/03/2016  [employer] Interactive Patient Education ©2017 [employer]